# Patient Record
Sex: FEMALE | URBAN - METROPOLITAN AREA
[De-identification: names, ages, dates, MRNs, and addresses within clinical notes are randomized per-mention and may not be internally consistent; named-entity substitution may affect disease eponyms.]

---

## 2021-04-27 ENCOUNTER — OFFICE VISIT (OUTPATIENT)
Dept: URBAN - METROPOLITAN AREA CLINIC 52 | Facility: CLINIC | Age: 36
End: 2021-04-27

## 2021-05-26 ENCOUNTER — LAB OUTSIDE AN ENCOUNTER (OUTPATIENT)
Dept: URBAN - METROPOLITAN AREA CLINIC 98 | Facility: CLINIC | Age: 36
End: 2021-05-26

## 2021-05-26 ENCOUNTER — OFFICE VISIT (OUTPATIENT)
Dept: URBAN - METROPOLITAN AREA CLINIC 98 | Facility: CLINIC | Age: 36
End: 2021-05-26
Payer: COMMERCIAL

## 2021-05-26 VITALS
WEIGHT: 209.6 LBS | DIASTOLIC BLOOD PRESSURE: 81 MMHG | BODY MASS INDEX: 31.04 KG/M2 | HEIGHT: 69 IN | HEART RATE: 81 BPM | SYSTOLIC BLOOD PRESSURE: 123 MMHG | TEMPERATURE: 97.7 F

## 2021-05-26 DIAGNOSIS — K51.00 ULCERATIVE PANCOLITIS WITHOUT COMPLICATION: ICD-10-CM

## 2021-05-26 DIAGNOSIS — K92.1 HEMATOCHEZIA: ICD-10-CM

## 2021-05-26 DIAGNOSIS — R10.9 ABDOMINAL PAIN: ICD-10-CM

## 2021-05-26 DIAGNOSIS — Z91.89 COLON CANCER HIGH RISK: ICD-10-CM

## 2021-05-26 DIAGNOSIS — R19.7 DIARRHEA: ICD-10-CM

## 2021-05-26 PROCEDURE — 99205 OFFICE O/P NEW HI 60 MIN: CPT | Performed by: INTERNAL MEDICINE

## 2021-05-26 RX ORDER — PREDNISONE 10 MG/1
2 TABLETS TABLET ORAL ONCE A DAY
Qty: 60 | Refills: 0 | OUTPATIENT
Start: 2021-05-26 | End: 2021-06-25

## 2021-05-26 RX ORDER — POLYETHYLENE GLYCOL 3350, SODIUM SULFATE, SODIUM CHLORIDE, POTASSIUM CHLORIDE, ASCORBIC ACID, SODIUM ASCORBATE 140-9-5.2G
1 KIT KIT ORAL ONCE
Qty: 1 | Refills: 1 | OUTPATIENT
Start: 2021-05-26 | End: 2021-05-28

## 2021-05-26 RX ORDER — BALSALAZIDE DISODIUM 750 MG/1
6 TAB CAPSULE ORAL QD
Qty: 180 TABLET | Refills: 11 | OUTPATIENT
Start: 2021-05-26 | End: 2022-05-21

## 2021-05-26 NOTE — HPI-TODAY'S VISIT:
Pt Tom is a 36 y/o with dx of UC. . Pt was diagnosed with UC in  by Dr. Morocho.  She has severe anemia associated with her colitis requiring 3 blood transfusions. . Today on 21, pt reports that she has rectal bleeding, diarrhea, urgency sxs.  6-10 BM per day, severe abdominal pain, diffuse in nature, nothing makes it better or worse.  She was taking some iron piils.   . may 19 2021: labs: cr 0.7, wbc 4.5, hgb 7.7 ct scan: mild left colonic thickening,  PMH: h/o partial nephrectomy, lupus, UC, ,  Meds: iron pill SH: no smoke/etoh

## 2021-06-22 ENCOUNTER — TELEPHONE ENCOUNTER (OUTPATIENT)
Dept: URBAN - METROPOLITAN AREA CLINIC 98 | Facility: CLINIC | Age: 36
End: 2021-06-22

## 2021-06-29 ENCOUNTER — WEB ENCOUNTER (OUTPATIENT)
Dept: URBAN - METROPOLITAN AREA CLINIC 98 | Facility: CLINIC | Age: 36
End: 2021-06-29

## 2021-07-01 ENCOUNTER — OFFICE VISIT (OUTPATIENT)
Dept: URBAN - METROPOLITAN AREA SURGERY CENTER 18 | Facility: SURGERY CENTER | Age: 36
End: 2021-07-01

## 2021-07-01 ENCOUNTER — TELEPHONE ENCOUNTER (OUTPATIENT)
Dept: URBAN - METROPOLITAN AREA CLINIC 98 | Facility: CLINIC | Age: 36
End: 2021-07-01

## 2021-07-01 RX ORDER — POLYETHYLENE GLYCOL 3350, SODIUM SULFATE, SODIUM CHLORIDE, POTASSIUM CHLORIDE, ASCORBIC ACID, SODIUM ASCORBATE 140-9-5.2G
1 KIT KIT ORAL ONCE
Qty: 1 | Refills: 1 | OUTPATIENT
Start: 2021-07-02 | End: 2021-07-04

## 2021-07-01 RX ORDER — BALSALAZIDE DISODIUM 750 MG/1
6 TAB CAPSULE ORAL QD
Qty: 180 TABLET | Refills: 11 | Status: ACTIVE | COMMUNITY
Start: 2021-05-26 | End: 2022-05-21

## 2021-07-23 ENCOUNTER — TELEPHONE ENCOUNTER (OUTPATIENT)
Dept: URBAN - METROPOLITAN AREA CLINIC 92 | Facility: CLINIC | Age: 36
End: 2021-07-23

## 2021-07-23 ENCOUNTER — OFFICE VISIT (OUTPATIENT)
Dept: URBAN - METROPOLITAN AREA TELEHEALTH 2 | Facility: TELEHEALTH | Age: 36
End: 2021-07-23
Payer: COMMERCIAL

## 2021-07-23 DIAGNOSIS — D50.8 ACQUIRED IRON DEFICIENCY ANEMIA DUE TO DECREASED ABSORPTION: ICD-10-CM

## 2021-07-23 DIAGNOSIS — R19.7 DIARRHEA: ICD-10-CM

## 2021-07-23 DIAGNOSIS — R10.84 ABDOMINAL CRAMPING, GENERALIZED: ICD-10-CM

## 2021-07-23 DIAGNOSIS — K51.011 CHRONIC ULCERATIVE ENTEROCOLITIS WITH RECTAL BLEEDING: ICD-10-CM

## 2021-07-23 PROCEDURE — 99215 OFFICE O/P EST HI 40 MIN: CPT | Performed by: INTERNAL MEDICINE

## 2021-07-23 RX ORDER — ADALIMUMAB 80MG/0.8ML
80MG DAY 1, DAY 2, DAY 15 KIT SUBCUTANEOUS
Qty: 3 PEN SYRINGE DOSES | Refills: 0 | OUTPATIENT
Start: 2021-07-23 | End: 2021-08-07

## 2021-07-23 RX ORDER — PREDNISONE 10 MG/1
2 TABLETS TABLET ORAL ONCE A DAY
Qty: 60 | Refills: 0 | OUTPATIENT
Start: 2021-07-23 | End: 2021-08-22

## 2021-07-23 RX ORDER — ADALIMUMAB 40MG/0.4ML
AS DIRECTED KIT SUBCUTANEOUS
Qty: 1 | Refills: 11 | OUTPATIENT
Start: 2021-07-23 | End: 2021-08-04

## 2021-07-23 RX ORDER — SULFASALAZINE 500 MG/1
6 TABLET TABLET ORAL ONCE A DAY
Qty: 180 TABLET | Refills: 11 | OUTPATIENT
Start: 2021-07-23 | End: 2022-07-18

## 2021-07-23 RX ORDER — BALSALAZIDE DISODIUM 750 MG/1
6 TAB CAPSULE ORAL QD
Qty: 180 TABLET | Refills: 11 | Status: ACTIVE | COMMUNITY
Start: 2021-05-26 | End: 2022-05-21

## 2021-07-23 NOTE — PHYSICAL EXAM CHEST:
Last OV: 10/29/19  Next OV: X  Labs: BMP*10/25/19-   LIPID*06/27/18  EKG:10/29/19  PT NEED LAB WORK FOR MORE REFILLS no lesions,  no deformities,  no traumatic injuries,  no significant scars are present,  chest wall non-tender,  no masses present, breathing is unlabored without accessory muscle use,normal breath sounds

## 2021-07-23 NOTE — HPI-TODAY'S VISIT:
Pt Tom is a 36 y/o with dx of UC, currently on Colazol (9 tab) here for f/u. . Pt was diagnosed with UC in  by Dr. Morocho.  She has severe anemia associated with her colitis requiring 3 blood transfusions. . Previously on 21, pt reports that she has rectal bleeding, diarrhea, urgency sxs.  6-10 BM per day, severe abdominal pain, diffuse in nature, nothing makes it better or worse.  She was taking some iron piils.   . Today on 2021, pt reports that she had to go to hospital 2 weeks after seeing us, was told that Hgb was fine.  She then went to PCP 1 week ago, Hgb 7.2.  No improvement with the Colazol.  She took some prednisone. . May 19 2021: labs: cr 0.7, wbc 4.5, hgb 7.7 ct scan: mild left colonic thickening, . PMH: h/o partial nephrectomy, lupus, UC, ,  Meds: iron pill SH: no smoke/etoh FH: Cousin with UC

## 2021-07-26 ENCOUNTER — OFFICE VISIT (OUTPATIENT)
Dept: URBAN - METROPOLITAN AREA CLINIC 98 | Facility: CLINIC | Age: 36
End: 2021-07-26

## 2021-07-30 ENCOUNTER — LAB OUTSIDE AN ENCOUNTER (OUTPATIENT)
Dept: URBAN - METROPOLITAN AREA TELEHEALTH 2 | Facility: TELEHEALTH | Age: 36
End: 2021-07-30

## 2021-08-05 ENCOUNTER — OFFICE VISIT (OUTPATIENT)
Dept: URBAN - METROPOLITAN AREA SURGERY CENTER 18 | Facility: SURGERY CENTER | Age: 36
End: 2021-08-05

## 2021-08-05 RX ORDER — ADALIMUMAB 80MG/0.8ML
80MG DAY 1, DAY 2, DAY 15 KIT SUBCUTANEOUS
Qty: 3 PEN SYRINGE DOSES | Refills: 0 | Status: ACTIVE | COMMUNITY
Start: 2021-07-23 | End: 2021-08-07

## 2021-08-05 RX ORDER — PREDNISONE 10 MG/1
2 TABLETS TABLET ORAL ONCE A DAY
Qty: 60 | Refills: 0 | Status: ACTIVE | COMMUNITY
Start: 2021-07-23 | End: 2021-08-22

## 2021-08-05 RX ORDER — BALSALAZIDE DISODIUM 750 MG/1
6 TAB CAPSULE ORAL QD
Qty: 180 TABLET | Refills: 11 | Status: ACTIVE | COMMUNITY
Start: 2021-05-26 | End: 2022-05-21

## 2021-08-05 RX ORDER — SULFASALAZINE 500 MG/1
6 TABLET TABLET ORAL ONCE A DAY
Qty: 180 TABLET | Refills: 11 | Status: ACTIVE | COMMUNITY
Start: 2021-07-23 | End: 2022-07-18

## 2021-08-19 ENCOUNTER — OFFICE VISIT (OUTPATIENT)
Dept: URBAN - METROPOLITAN AREA CLINIC 97 | Facility: CLINIC | Age: 36
End: 2021-08-19
Payer: COMMERCIAL

## 2021-08-19 DIAGNOSIS — E61.1 HYPOFERREMIA: ICD-10-CM

## 2021-08-19 PROCEDURE — 96365 THER/PROPH/DIAG IV INF INIT: CPT | Performed by: INTERNAL MEDICINE

## 2021-08-19 RX ORDER — SULFASALAZINE 500 MG/1
6 TABLET TABLET ORAL ONCE A DAY
Qty: 180 TABLET | Refills: 11 | Status: ACTIVE | COMMUNITY
Start: 2021-07-23 | End: 2022-07-18

## 2021-08-19 RX ORDER — PREDNISONE 10 MG/1
2 TABLETS TABLET ORAL ONCE A DAY
Qty: 60 | Refills: 0 | Status: ACTIVE | COMMUNITY
Start: 2021-07-23 | End: 2021-08-22

## 2021-08-19 RX ORDER — BALSALAZIDE DISODIUM 750 MG/1
6 TAB CAPSULE ORAL QD
Qty: 180 TABLET | Refills: 11 | Status: ACTIVE | COMMUNITY
Start: 2021-05-26 | End: 2022-05-21

## 2021-08-26 ENCOUNTER — OFFICE VISIT (OUTPATIENT)
Dept: URBAN - METROPOLITAN AREA CLINIC 97 | Facility: CLINIC | Age: 36
End: 2021-08-26

## 2021-08-26 ENCOUNTER — TELEPHONE ENCOUNTER (OUTPATIENT)
Dept: URBAN - METROPOLITAN AREA CLINIC 18 | Facility: CLINIC | Age: 36
End: 2021-08-26

## 2021-09-02 ENCOUNTER — OFFICE VISIT (OUTPATIENT)
Dept: URBAN - METROPOLITAN AREA CLINIC 97 | Facility: CLINIC | Age: 36
End: 2021-09-02
Payer: COMMERCIAL

## 2021-09-02 DIAGNOSIS — E61.1 HYPOFERREMIA: ICD-10-CM

## 2021-09-02 PROCEDURE — 96365 THER/PROPH/DIAG IV INF INIT: CPT | Performed by: INTERNAL MEDICINE

## 2021-09-02 RX ORDER — BALSALAZIDE DISODIUM 750 MG/1
6 TAB CAPSULE ORAL QD
Qty: 180 TABLET | Refills: 11 | Status: ACTIVE | COMMUNITY
Start: 2021-05-26 | End: 2022-05-21

## 2021-09-02 RX ORDER — SULFASALAZINE 500 MG/1
6 TABLET TABLET ORAL ONCE A DAY
Qty: 180 TABLET | Refills: 11 | Status: ACTIVE | COMMUNITY
Start: 2021-07-23 | End: 2022-07-18

## 2022-08-18 ENCOUNTER — TELEPHONE ENCOUNTER (OUTPATIENT)
Dept: URBAN - METROPOLITAN AREA CLINIC 92 | Facility: CLINIC | Age: 37
End: 2022-08-18

## 2022-08-18 RX ORDER — ADALIMUMAB 40MG/0.4ML
1 PEN KIT SUBCUTANEOUS
Qty: 1 | Refills: 11 | OUTPATIENT
Start: 2022-08-18 | End: 2022-08-30

## 2022-08-25 ENCOUNTER — TELEPHONE ENCOUNTER (OUTPATIENT)
Dept: URBAN - METROPOLITAN AREA CLINIC 96 | Facility: CLINIC | Age: 37
End: 2022-08-25

## 2022-08-25 RX ORDER — ADALIMUMAB 40MG/0.4ML
1 PEN KIT SUBCUTANEOUS
Qty: 1 | Refills: 11
Start: 2022-08-18 | End: 2022-09-14

## 2022-09-13 ENCOUNTER — TELEPHONE ENCOUNTER (OUTPATIENT)
Dept: URBAN - METROPOLITAN AREA CLINIC 6 | Facility: CLINIC | Age: 37
End: 2022-09-13

## 2022-09-13 ENCOUNTER — LAB OUTSIDE AN ENCOUNTER (OUTPATIENT)
Dept: URBAN - METROPOLITAN AREA CLINIC 96 | Facility: CLINIC | Age: 37
End: 2022-09-13

## 2022-09-13 RX ORDER — ADALIMUMAB 40MG/0.4ML
1 PEN KIT SUBCUTANEOUS
Qty: 1 | Refills: 11
Start: 2022-08-18 | End: 2022-09-26

## 2022-09-27 ENCOUNTER — OFFICE VISIT (OUTPATIENT)
Dept: URBAN - METROPOLITAN AREA CLINIC 96 | Facility: CLINIC | Age: 37
End: 2022-09-27

## 2022-10-02 LAB
A/G RATIO: 1.1
ABSOLUTE BASOPHILS: 31
ABSOLUTE EOSINOPHILS: 0
ABSOLUTE LYMPHOCYTES: 1309
ABSOLUTE MONOCYTES: 878
ABSOLUTE NEUTROPHILS: (no result)
ADALIMUMAB AB, IBD: <10
ADALIMUMAB AB, IBD: <10
ADALIMUMAB LEVEL, IBD: 3
ALBUMIN: 3.7
ALKALINE PHOSPHATASE: 63
ALT (SGPT): 9
AST (SGOT): 10
BASOPHILS: 0.2
BILIRUBIN, TOTAL: 0.2
BUN/CREATININE RATIO: (no result)
BUN: 19
CALCIUM: 9.7
CARBON DIOXIDE, TOTAL: 24
CBC MORPHOLOGY: (no result)
CHLORIDE: 105
COMMENT: (no result)
CREATININE: 0.76
EGFR: 105
EOSINOPHILS: 0
GLOBULIN, TOTAL: 3.5
GLUCOSE: 97
HEMATOCRIT: 23.9
HEMOGLOBIN: 6.9
INTERPRETATION: (no result)
LYMPHOCYTES: 8.5
MCH: 19
MCHC: 28.9
MCV: 65.8
MITOGEN-NIL: 8.39
MONOCYTES: 5.7
MPV: 9.1
NEUTROPHILS: 85.6
NIL: 0.17
PLATELET COUNT: 626
POTASSIUM: 4.3
PROTEIN, TOTAL: 7.2
QUANTIFERON(R)-TB GOLD: NEGATIVE
RDW: 16.6
RED BLOOD CELL COUNT: 3.63
SODIUM: 139
TB1-NIL: <0
TB2-NIL: <0
WHITE BLOOD CELL COUNT: 15.4

## 2022-10-16 ENCOUNTER — TELEPHONE ENCOUNTER (OUTPATIENT)
Dept: URBAN - METROPOLITAN AREA CLINIC 92 | Facility: CLINIC | Age: 37
End: 2022-10-16

## 2022-11-01 ENCOUNTER — OFFICE VISIT (OUTPATIENT)
Dept: URBAN - METROPOLITAN AREA CLINIC 96 | Facility: CLINIC | Age: 37
End: 2022-11-01

## 2022-11-01 VITALS — HEIGHT: 69 IN

## 2022-11-28 ENCOUNTER — CLAIMS CREATED FROM THE CLAIM WINDOW (OUTPATIENT)
Dept: URBAN - METROPOLITAN AREA TELEHEALTH 2 | Facility: TELEHEALTH | Age: 37
End: 2022-11-28
Payer: COMMERCIAL

## 2022-11-28 ENCOUNTER — TELEPHONE ENCOUNTER (OUTPATIENT)
Dept: URBAN - METROPOLITAN AREA CLINIC 92 | Facility: CLINIC | Age: 37
End: 2022-11-28

## 2022-11-28 ENCOUNTER — LAB OUTSIDE AN ENCOUNTER (OUTPATIENT)
Dept: URBAN - METROPOLITAN AREA TELEHEALTH 2 | Facility: TELEHEALTH | Age: 37
End: 2022-11-28

## 2022-11-28 DIAGNOSIS — Z91.89 COLON CANCER HIGH RISK: ICD-10-CM

## 2022-11-28 DIAGNOSIS — K92.1 HEMATOCHEZIA: ICD-10-CM

## 2022-11-28 DIAGNOSIS — D50.9 IRON DEFICIENCY ANEMIA: ICD-10-CM

## 2022-11-28 DIAGNOSIS — R19.7 DIARRHEA: ICD-10-CM

## 2022-11-28 DIAGNOSIS — R10.9 ABDOMINAL PAIN: ICD-10-CM

## 2022-11-28 DIAGNOSIS — K51.00 ULCERATIVE PANCOLITIS WITHOUT COMPLICATION: ICD-10-CM

## 2022-11-28 PROCEDURE — 99215 OFFICE O/P EST HI 40 MIN: CPT | Performed by: INTERNAL MEDICINE

## 2022-11-28 RX ORDER — FERRIC CARBOXYMALTOSE INJECTION 50 MG/ML
AS DIRECTED INJECTION, SOLUTION INTRAVENOUS
Qty: 2 | Refills: 0 | OUTPATIENT
Start: 2022-11-28 | End: 2022-12-12

## 2022-11-28 RX ORDER — ADALIMUMAB 40MG/0.4ML
1 PEN KIT SUBCUTANEOUS
Qty: 1 | Refills: 11 | OUTPATIENT
Start: 2022-11-28 | End: 2022-12-10

## 2022-11-28 RX ORDER — SULFASALAZINE 500 MG/1
6 TABLET TABLET ORAL ONCE A DAY
Qty: 180 TABLET | Refills: 11 | OUTPATIENT
Start: 2022-11-28 | End: 2023-11-23

## 2022-11-28 RX ORDER — PANTOPRAZOLE SODIUM 40 MG/1
1 TABLET TABLET, DELAYED RELEASE ORAL EVERY 12 HOURS
Qty: 60 TABLET | Refills: 11 | OUTPATIENT
Start: 2022-11-28

## 2022-11-28 RX ORDER — PREDNISONE 10 MG/1
1 TABLET TABLET ORAL ONCE A DAY
Qty: 30 TABLET | Refills: 0 | OUTPATIENT
Start: 2022-11-28 | End: 2022-12-28

## 2022-11-28 NOTE — PHYSICAL EXAM CONSTITUTIONAL:
well developed, well nourished , in no acute distress , ambulating without difficulty , normal communication ability
Followed protocol

## 2022-11-28 NOTE — HPI-TODAY'S VISIT:
Pt Tom is a 35 y/o with dx of UC, currently on Humira (every 2 weeks, started ) and previously Colazol (9 tab) here for f/u. . Pt was diagnosed with UC in  by Dr. Morocho.  She has severe anemia associated with her colitis requiring 3 blood transfusions. . Previously on 21, pt reports that she has rectal bleeding, diarrhea, urgency sxs.  6-10 BM per day, severe abdominal pain, diffuse in nature, nothing makes it better or worse.  She was taking some iron piils.   . Previously on 2021, pt reports that she had to go to hospital 2 weeks after seeing us, was told that Hgb was fine.  She then went to PCP 1 week ago, Hgb 7.2.  No improvement with the Colazol.  She took some prednisone. . Today on 2022, pt reports that she is taking all of her meds, Humira is working well.  No need for any blood transfusions over the past year.  Having 3-4 loose BM, some blood in the stool.  Urgency is improved, but still present on some occasions. Occasional abd pain.  Having some fever blisters (when iron levels low). . May 19 2021: labs: cr 0.7, wbc 4.5, hgb 7.7 ct scan: mild left colonic thickening, . PMH: h/o partial nephrectomy, lupus, UC, ,  Meds: iron pill SH: no smoke/etoh FH: Cousin with UC

## 2022-12-01 ENCOUNTER — WEB ENCOUNTER (OUTPATIENT)
Dept: URBAN - METROPOLITAN AREA CLINIC 96 | Facility: CLINIC | Age: 37
End: 2022-12-01

## 2022-12-01 RX ORDER — SULFASALAZINE 500 MG/1
6 TABLET TABLET ORAL ONCE A DAY
Qty: 180 TABLET | Refills: 11
Start: 2022-11-28 | End: 2023-11-27

## 2022-12-06 ENCOUNTER — WEB ENCOUNTER (OUTPATIENT)
Dept: URBAN - METROPOLITAN AREA CLINIC 98 | Facility: CLINIC | Age: 37
End: 2022-12-06

## 2022-12-06 RX ORDER — PANTOPRAZOLE SODIUM 40 MG/1
1 TABLET TABLET, DELAYED RELEASE ORAL EVERY 12 HOURS
Qty: 60 TABLET | Refills: 11 | Status: ACTIVE | COMMUNITY
Start: 2022-11-28

## 2022-12-06 RX ORDER — SULFASALAZINE 500 MG/1
6 TABLET TABLET ORAL ONCE A DAY
Qty: 180 TABLET | Refills: 11 | Status: ACTIVE | COMMUNITY
Start: 2022-11-28 | End: 2023-11-27

## 2022-12-06 RX ORDER — BALSALAZIDE DISODIUM 750 MG/1
6 CAPSULES CAPSULE ORAL ONCE DAILY
Qty: 180 CAPSULE | Refills: 11 | OUTPATIENT
Start: 2022-12-07 | End: 2023-12-02

## 2022-12-06 RX ORDER — ADALIMUMAB 40MG/0.4ML
1 PEN KIT SUBCUTANEOUS
Qty: 1 | Refills: 11 | Status: ACTIVE | COMMUNITY
Start: 2022-11-28 | End: 2022-12-10

## 2022-12-06 RX ORDER — PREDNISONE 10 MG/1
1 TABLET TABLET ORAL ONCE A DAY
Qty: 30 TABLET | Refills: 0 | Status: ACTIVE | COMMUNITY
Start: 2022-11-28 | End: 2022-12-28

## 2022-12-06 RX ORDER — FERRIC CARBOXYMALTOSE INJECTION 50 MG/ML
AS DIRECTED INJECTION, SOLUTION INTRAVENOUS
Qty: 2 | Refills: 0 | Status: ACTIVE | COMMUNITY
Start: 2022-11-28 | End: 2022-12-12

## 2022-12-13 ENCOUNTER — OFFICE VISIT (OUTPATIENT)
Dept: URBAN - METROPOLITAN AREA CLINIC 97 | Facility: CLINIC | Age: 37
End: 2022-12-13
Payer: COMMERCIAL

## 2022-12-13 ENCOUNTER — WEB ENCOUNTER (OUTPATIENT)
Dept: URBAN - METROPOLITAN AREA CLINIC 98 | Facility: CLINIC | Age: 37
End: 2022-12-13

## 2022-12-13 VITALS
HEIGHT: 69 IN | BODY MASS INDEX: 27.4 KG/M2 | HEART RATE: 84 BPM | SYSTOLIC BLOOD PRESSURE: 138 MMHG | TEMPERATURE: 97 F | RESPIRATION RATE: 18 BRPM | WEIGHT: 185 LBS | DIASTOLIC BLOOD PRESSURE: 80 MMHG

## 2022-12-13 DIAGNOSIS — D50.9 IRON DEFICIENCY ANEMIA: ICD-10-CM

## 2022-12-13 PROCEDURE — 96365 THER/PROPH/DIAG IV INF INIT: CPT | Performed by: INTERNAL MEDICINE

## 2022-12-13 RX ORDER — BALSALAZIDE DISODIUM 750 MG/1
6 CAPSULES CAPSULE ORAL ONCE DAILY
Qty: 180 CAPSULE | Refills: 11 | Status: ACTIVE | COMMUNITY
Start: 2022-12-07 | End: 2023-12-02

## 2022-12-13 RX ORDER — PREDNISONE 10 MG/1
1 TABLET TABLET ORAL ONCE A DAY
Qty: 30 TABLET | Refills: 0 | Status: ACTIVE | COMMUNITY
Start: 2022-11-28 | End: 2022-12-28

## 2022-12-13 RX ORDER — SULFASALAZINE 500 MG/1
6 TABLET TABLET ORAL ONCE A DAY
Qty: 180 TABLET | Refills: 11 | Status: ACTIVE | COMMUNITY
Start: 2022-11-28 | End: 2023-11-27

## 2022-12-13 RX ORDER — PANTOPRAZOLE SODIUM 40 MG/1
1 TABLET TABLET, DELAYED RELEASE ORAL EVERY 12 HOURS
Qty: 60 TABLET | Refills: 11 | Status: ACTIVE | COMMUNITY
Start: 2022-11-28

## 2022-12-14 PROBLEM — 87522002 IRON DEFICIENCY ANEMIA: Status: ACTIVE | Noted: 2021-07-23

## 2022-12-20 ENCOUNTER — OFFICE VISIT (OUTPATIENT)
Dept: URBAN - METROPOLITAN AREA CLINIC 97 | Facility: CLINIC | Age: 37
End: 2022-12-20
Payer: COMMERCIAL

## 2022-12-20 VITALS
HEART RATE: 87 BPM | TEMPERATURE: 97 F | BODY MASS INDEX: 30.3 KG/M2 | RESPIRATION RATE: 17 BRPM | SYSTOLIC BLOOD PRESSURE: 127 MMHG | HEIGHT: 69 IN | DIASTOLIC BLOOD PRESSURE: 90 MMHG | WEIGHT: 204.6 LBS

## 2022-12-20 DIAGNOSIS — D50.8 OTHER IRON DEFICIENCY ANEMIA: ICD-10-CM

## 2022-12-20 PROCEDURE — 96365 THER/PROPH/DIAG IV INF INIT: CPT | Performed by: INTERNAL MEDICINE

## 2022-12-20 RX ORDER — BALSALAZIDE DISODIUM 750 MG/1
6 CAPSULES CAPSULE ORAL ONCE DAILY
Qty: 180 CAPSULE | Refills: 11 | Status: ACTIVE | COMMUNITY
Start: 2022-12-07 | End: 2023-12-02

## 2022-12-20 RX ORDER — SULFASALAZINE 500 MG/1
6 TABLET TABLET ORAL ONCE A DAY
Qty: 180 TABLET | Refills: 11 | Status: ACTIVE | COMMUNITY
Start: 2022-11-28 | End: 2023-11-27

## 2022-12-20 RX ORDER — PANTOPRAZOLE SODIUM 40 MG/1
1 TABLET TABLET, DELAYED RELEASE ORAL EVERY 12 HOURS
Qty: 60 TABLET | Refills: 11 | Status: ACTIVE | COMMUNITY
Start: 2022-11-28

## 2022-12-20 RX ORDER — PREDNISONE 10 MG/1
1 TABLET TABLET ORAL ONCE A DAY
Qty: 30 TABLET | Refills: 0 | Status: ACTIVE | COMMUNITY
Start: 2022-11-28 | End: 2022-12-28

## 2022-12-22 PROBLEM — 87522002 IRON DEFICIENCY ANEMIA: Status: ACTIVE | Noted: 2022-12-22

## 2022-12-28 ENCOUNTER — TELEPHONE ENCOUNTER (OUTPATIENT)
Dept: URBAN - METROPOLITAN AREA CLINIC 92 | Facility: CLINIC | Age: 37
End: 2022-12-28

## 2022-12-28 RX ORDER — ADALIMUMAB 40MG/0.4ML
1 PEN KIT SUBCUTANEOUS
Qty: 1 | Refills: 11
Start: 2022-11-28 | End: 2023-01-09

## 2023-01-17 ENCOUNTER — WEB ENCOUNTER (OUTPATIENT)
Dept: URBAN - METROPOLITAN AREA CLINIC 98 | Facility: CLINIC | Age: 38
End: 2023-01-17

## 2023-01-17 RX ORDER — SULFASALAZINE 500 MG/1
6 TABLET TABLET ORAL ONCE A DAY
Qty: 180 TABLET | Refills: 11 | Status: ACTIVE | COMMUNITY
Start: 2022-11-28 | End: 2023-11-27

## 2023-01-17 RX ORDER — PANTOPRAZOLE SODIUM 40 MG/1
1 TABLET TABLET, DELAYED RELEASE ORAL EVERY 12 HOURS
Qty: 60 TABLET | Refills: 11 | Status: ACTIVE | COMMUNITY
Start: 2022-11-28

## 2023-01-17 RX ORDER — POLYETHYLENE GLYCOL 3350, SODIUM SULFATE, SODIUM CHLORIDE, POTASSIUM CHLORIDE, ASCORBIC ACID, SODIUM ASCORBATE 140-9-5.2G
1 KIT KIT ORAL ONCE
Qty: 1 | Refills: 1 | OUTPATIENT
Start: 2023-01-18 | End: 2023-01-20

## 2023-01-17 RX ORDER — BALSALAZIDE DISODIUM 750 MG/1
6 CAPSULES CAPSULE ORAL ONCE DAILY
Qty: 180 CAPSULE | Refills: 11 | Status: ACTIVE | COMMUNITY
Start: 2022-12-07 | End: 2023-12-02

## 2023-01-17 RX ORDER — ONDANSETRON HYDROCHLORIDE 4 MG/1
1 TABLET TABLET, FILM COATED ORAL EVERY 4 HOURS PRN
Qty: 3 | Refills: 0 | OUTPATIENT
Start: 2023-01-18

## 2023-02-07 PROBLEM — 442159003 CHRONIC ULCERATIVE PANCOLITIS: Status: ACTIVE | Noted: 2021-05-26

## 2023-02-23 ENCOUNTER — WEB ENCOUNTER (OUTPATIENT)
Dept: URBAN - METROPOLITAN AREA CLINIC 96 | Facility: CLINIC | Age: 38
End: 2023-02-23

## 2023-02-24 ENCOUNTER — OFFICE VISIT (OUTPATIENT)
Dept: URBAN - METROPOLITAN AREA SURGERY CENTER 18 | Facility: SURGERY CENTER | Age: 38
End: 2023-02-24

## 2023-06-17 ENCOUNTER — WEB ENCOUNTER (OUTPATIENT)
Dept: URBAN - METROPOLITAN AREA CLINIC 96 | Facility: CLINIC | Age: 38
End: 2023-06-17

## 2023-07-27 ENCOUNTER — TELEPHONE ENCOUNTER (OUTPATIENT)
Dept: URBAN - METROPOLITAN AREA CLINIC 96 | Facility: CLINIC | Age: 38
End: 2023-07-27

## 2023-07-27 ENCOUNTER — OFFICE VISIT (OUTPATIENT)
Dept: URBAN - METROPOLITAN AREA SURGERY CENTER 18 | Facility: SURGERY CENTER | Age: 38
End: 2023-07-27
Payer: COMMERCIAL

## 2023-07-27 DIAGNOSIS — K51.00 ACUTE ULCERATIVE PANCOLITIS: ICD-10-CM

## 2023-07-27 PROCEDURE — G8907 PT DOC NO EVENTS ON DISCHARG: HCPCS | Performed by: INTERNAL MEDICINE

## 2023-07-27 PROCEDURE — 45380 COLONOSCOPY AND BIOPSY: CPT | Performed by: INTERNAL MEDICINE

## 2023-07-27 RX ORDER — BALSALAZIDE DISODIUM 750 MG/1
6 CAPSULES CAPSULE ORAL ONCE DAILY
Qty: 180 CAPSULE | Refills: 11 | Status: ACTIVE | COMMUNITY
Start: 2022-12-07 | End: 2023-12-02

## 2023-07-27 RX ORDER — PANTOPRAZOLE SODIUM 40 MG/1
1 TABLET TABLET, DELAYED RELEASE ORAL EVERY 12 HOURS
Qty: 60 TABLET | Refills: 11 | Status: ACTIVE | COMMUNITY
Start: 2022-11-28

## 2023-07-27 RX ORDER — ONDANSETRON HYDROCHLORIDE 4 MG/1
1 TABLET TABLET, FILM COATED ORAL EVERY 4 HOURS PRN
Qty: 3 | Refills: 0 | Status: ACTIVE | COMMUNITY
Start: 2023-01-18

## 2023-07-27 RX ORDER — SULFASALAZINE 500 MG/1
6 TABLET TABLET ORAL ONCE A DAY
Qty: 180 TABLET | Refills: 11 | Status: ACTIVE | COMMUNITY
Start: 2022-11-28 | End: 2023-11-27

## 2023-07-31 ENCOUNTER — WEB ENCOUNTER (OUTPATIENT)
Dept: URBAN - METROPOLITAN AREA CLINIC 96 | Facility: CLINIC | Age: 38
End: 2023-07-31

## 2023-07-31 RX ORDER — METRONIDAZOLE 500 MG/1
1 TABLET TABLET, FILM COATED ORAL
Qty: 28 TABLET | Refills: 0 | OUTPATIENT
Start: 2023-07-31 | End: 2023-08-14

## 2023-08-01 ENCOUNTER — WEB ENCOUNTER (OUTPATIENT)
Dept: URBAN - METROPOLITAN AREA CLINIC 96 | Facility: CLINIC | Age: 38
End: 2023-08-01

## 2023-09-20 ENCOUNTER — OFFICE VISIT (OUTPATIENT)
Dept: URBAN - METROPOLITAN AREA CLINIC 96 | Facility: CLINIC | Age: 38
End: 2023-09-20

## 2023-09-20 RX ORDER — ONDANSETRON HYDROCHLORIDE 4 MG/1
1 TABLET TABLET, FILM COATED ORAL EVERY 4 HOURS PRN
Qty: 3 | Refills: 0 | Status: ACTIVE | COMMUNITY
Start: 2023-01-18

## 2023-09-20 RX ORDER — SULFASALAZINE 500 MG/1
6 TABLET TABLET ORAL ONCE A DAY
Qty: 180 TABLET | Refills: 11 | Status: ACTIVE | COMMUNITY
Start: 2022-11-28 | End: 2023-11-27

## 2023-09-20 RX ORDER — PANTOPRAZOLE SODIUM 40 MG/1
1 TABLET TABLET, DELAYED RELEASE ORAL EVERY 12 HOURS
Qty: 60 TABLET | Refills: 11 | Status: ACTIVE | COMMUNITY
Start: 2022-11-28

## 2023-09-20 RX ORDER — BALSALAZIDE DISODIUM 750 MG/1
6 CAPSULES CAPSULE ORAL ONCE DAILY
Qty: 180 CAPSULE | Refills: 11 | Status: ACTIVE | COMMUNITY
Start: 2022-12-07 | End: 2023-12-02

## 2023-10-23 ENCOUNTER — P2P PATIENT RECORD (OUTPATIENT)
Age: 38
End: 2023-10-23

## 2023-11-17 ENCOUNTER — OFFICE VISIT (OUTPATIENT)
Dept: URBAN - METROPOLITAN AREA CLINIC 52 | Facility: CLINIC | Age: 38
End: 2023-11-17

## 2023-11-17 RX ORDER — PANTOPRAZOLE SODIUM 40 MG/1
1 TABLET TABLET, DELAYED RELEASE ORAL EVERY 12 HOURS
Qty: 60 TABLET | Refills: 11 | Status: ACTIVE | COMMUNITY
Start: 2022-11-28

## 2023-11-17 RX ORDER — SULFASALAZINE 500 MG/1
6 TABLET TABLET ORAL ONCE A DAY
Qty: 180 TABLET | Refills: 11 | Status: ACTIVE | COMMUNITY
Start: 2022-11-28 | End: 2023-11-27

## 2023-11-17 RX ORDER — ONDANSETRON HYDROCHLORIDE 4 MG/1
1 TABLET TABLET, FILM COATED ORAL EVERY 4 HOURS PRN
Qty: 3 | Refills: 0 | Status: ACTIVE | COMMUNITY
Start: 2023-01-18

## 2023-11-17 RX ORDER — BALSALAZIDE DISODIUM 750 MG/1
6 CAPSULES CAPSULE ORAL ONCE DAILY
Qty: 180 CAPSULE | Refills: 11 | Status: ACTIVE | COMMUNITY
Start: 2022-12-07 | End: 2023-12-02

## 2023-12-27 ENCOUNTER — CLAIMS CREATED FROM THE CLAIM WINDOW (OUTPATIENT)
Dept: URBAN - METROPOLITAN AREA CLINIC 52 | Facility: CLINIC | Age: 38
End: 2023-12-27
Payer: COMMERCIAL

## 2023-12-27 ENCOUNTER — LAB OUTSIDE AN ENCOUNTER (OUTPATIENT)
Dept: URBAN - METROPOLITAN AREA CLINIC 52 | Facility: CLINIC | Age: 38
End: 2023-12-27

## 2023-12-27 ENCOUNTER — TELEPHONE ENCOUNTER (OUTPATIENT)
Dept: URBAN - METROPOLITAN AREA CLINIC 94 | Facility: CLINIC | Age: 38
End: 2023-12-27

## 2023-12-27 VITALS
SYSTOLIC BLOOD PRESSURE: 124 MMHG | TEMPERATURE: 98.1 F | OXYGEN SATURATION: 98 % | HEIGHT: 69 IN | HEART RATE: 115 BPM | WEIGHT: 208 LBS | DIASTOLIC BLOOD PRESSURE: 81 MMHG | BODY MASS INDEX: 30.81 KG/M2

## 2023-12-27 DIAGNOSIS — K52.9 CHRONIC DIARRHEA: ICD-10-CM

## 2023-12-27 DIAGNOSIS — K51.818 OTHER ULCERATIVE COLITIS WITH OTHER COMPLICATION: ICD-10-CM

## 2023-12-27 DIAGNOSIS — K62.5 RECTAL BLEEDING: ICD-10-CM

## 2023-12-27 DIAGNOSIS — K51.919 ULCERATIVE COLITIS WITH COMPLICATION, UNSPECIFIED LOCATION: ICD-10-CM

## 2023-12-27 DIAGNOSIS — R19.7 CHRONIC DIARRHEA: ICD-10-CM

## 2023-12-27 DIAGNOSIS — D50.8 OTHER IRON DEFICIENCY ANEMIA: ICD-10-CM

## 2023-12-27 PROBLEM — 64766004: Status: ACTIVE | Noted: 2023-12-27

## 2023-12-27 PROCEDURE — 99214 OFFICE O/P EST MOD 30 MIN: CPT | Performed by: PHYSICIAN ASSISTANT

## 2023-12-27 PROCEDURE — 99214 OFFICE O/P EST MOD 30 MIN: CPT | Performed by: INTERNAL MEDICINE

## 2023-12-27 RX ORDER — PANTOPRAZOLE SODIUM 40 MG/1
1 TABLET TABLET, DELAYED RELEASE ORAL EVERY 12 HOURS
Qty: 60 TABLET | Refills: 11 | Status: ON HOLD | COMMUNITY
Start: 2022-11-28

## 2023-12-27 RX ORDER — ONDANSETRON HYDROCHLORIDE 4 MG/1
1 TABLET TABLET, FILM COATED ORAL EVERY 4 HOURS PRN
Qty: 3 | Refills: 0 | Status: ON HOLD | COMMUNITY
Start: 2023-01-18

## 2023-12-27 RX ORDER — BALSALAZIDE DISODIUM 750 MG/1
2 CAPSULES CAPSULE ORAL TWICE A DAY
Status: ACTIVE | COMMUNITY

## 2023-12-27 RX ORDER — DULOXETINE 30 MG/1
1 CAPSULE CAPSULE, DELAYED RELEASE PELLETS ORAL ONCE A DAY
Status: ACTIVE | COMMUNITY

## 2023-12-27 NOTE — HPI-TODAY'S VISIT:
38 y.o. female with ulcerative colitis (diagnosed 2020 by Dr. Morocho), severe GRETEL requiring transfusions and iron infusions, and Lupus presents to office for UC follow up. She was previously followed by Dr. Lino, but his office does not accept her insurance at this time. She has been on Balsalazide and Humira for 2-3 years, with no significant improvement in UC symptoms. Symptoms include bloody diarrhea (up to 5 watery stools a day), abdominal bloating and discomfort. Patient states that after her July colonoscopy, Dr. Lino discussed starting Stelara, but she was unable to follow-up due to insurance. She continues to take Balsalazide, but has been out of Humira for 2 months.   Colonoscopy 7/27/23 (Dr. Lino) with erythematous mucosa AC, and diffuse moderate inflammation in TC, DC, and rectum. Pathology consistent with moderate to severe active colitis with ulceration.

## 2024-01-02 LAB
A/G RATIO: 1.2
ALBUMIN: 4
ALKALINE PHOSPHATASE: 74
ALT (SGPT): 9
AST (SGOT): 14
BILIRUBIN, TOTAL: 0.2
BUN/CREATININE RATIO: (no result)
BUN: 7
C-REACTIVE PROTEIN, QUANT: 2.3
CALCIUM: 9.4
CARBON DIOXIDE, TOTAL: 23
CHLORIDE: 106
CREATININE: 0.63
EGFR: 117
FERRITIN, SERUM: <1
FOLATE (FOLIC ACID), SERUM: 17.4
GLOBULIN, TOTAL: 3.4
GLUCOSE: 110
HEMATOCRIT: 25.9
HEMOGLOBIN: 7.2
HEPATITIS B CORE AB TOTAL: (no result)
HEPATITIS B SURFACE ANTIGEN: (no result)
IRON BIND.CAP.(TIBC): 399
IRON SATURATION: 3
IRON: 13
MCH: 18.5
MCHC: 27.8
MCV: 66.6
MITOGEN-NIL: 6.31
MPV: 9.6
PLATELET COUNT: 618
POTASSIUM: 4.4
PROTEIN, TOTAL: 7.4
QUANTIFERON NIL VALUE: 0.11
QUANTIFERON TB1 AG VALUE: 0.03
QUANTIFERON TB2 AG VALUE: 0.09
QUANTIFERON-TB GOLD PLUS: NEGATIVE
RDW: 15.8
RED BLOOD CELL COUNT: 3.89
SODIUM: 139
VITAMIN B12: 339
WHITE BLOOD CELL COUNT: 5.7

## 2024-01-04 ENCOUNTER — TELEPHONE ENCOUNTER (OUTPATIENT)
Dept: URBAN - METROPOLITAN AREA CLINIC 94 | Facility: CLINIC | Age: 39
End: 2024-01-04

## 2024-01-04 PROBLEM — 442159003: Status: ACTIVE | Noted: 2024-01-04

## 2024-01-09 ENCOUNTER — TELEPHONE ENCOUNTER (OUTPATIENT)
Dept: URBAN - METROPOLITAN AREA CLINIC 52 | Facility: CLINIC | Age: 39
End: 2024-01-09

## 2024-01-17 ENCOUNTER — OFFICE VISIT (OUTPATIENT)
Dept: URBAN - METROPOLITAN AREA SURGERY CENTER 17 | Facility: SURGERY CENTER | Age: 39
End: 2024-01-17

## 2024-02-26 ENCOUNTER — LAB (OUTPATIENT)
Dept: URBAN - METROPOLITAN AREA CLINIC 52 | Facility: CLINIC | Age: 39
End: 2024-02-26

## 2024-02-26 ENCOUNTER — OV EP (OUTPATIENT)
Dept: URBAN - METROPOLITAN AREA CLINIC 52 | Facility: CLINIC | Age: 39
End: 2024-02-26
Payer: MEDICARE

## 2024-02-26 VITALS
BODY MASS INDEX: 30.66 KG/M2 | TEMPERATURE: 99 F | HEIGHT: 69 IN | SYSTOLIC BLOOD PRESSURE: 140 MMHG | DIASTOLIC BLOOD PRESSURE: 83 MMHG | HEART RATE: 102 BPM | WEIGHT: 207 LBS | OXYGEN SATURATION: 97 %

## 2024-02-26 DIAGNOSIS — D50.9 IRON DEFICIENCY ANEMIA, UNSPECIFIED IRON DEFICIENCY ANEMIA TYPE: ICD-10-CM

## 2024-02-26 DIAGNOSIS — K51.911 ULCERATIVE COLITIS WITH RECTAL BLEEDING, UNSPECIFIED LOCATION: ICD-10-CM

## 2024-02-26 PROBLEM — 64766004: Status: ACTIVE | Noted: 2024-02-26

## 2024-02-26 PROBLEM — 87522002: Status: ACTIVE | Noted: 2024-02-26

## 2024-02-26 PROCEDURE — 99214 OFFICE O/P EST MOD 30 MIN: CPT | Performed by: PHYSICIAN ASSISTANT

## 2024-02-26 RX ORDER — USTEKINUMAB 90 MG/ML
AS DIRECTED INJECTION, SOLUTION SUBCUTANEOUS
Qty: 1 | Refills: 2 | OUTPATIENT
Start: 2024-02-26 | End: 2024-08-12

## 2024-02-26 RX ORDER — BALSALAZIDE DISODIUM 750 MG/1
2 CAPSULES CAPSULE ORAL TWICE A DAY
Status: ACTIVE | COMMUNITY

## 2024-02-26 RX ORDER — PANTOPRAZOLE SODIUM 40 MG/1
1 TABLET TABLET, DELAYED RELEASE ORAL EVERY 12 HOURS
Qty: 60 TABLET | Refills: 11 | Status: ON HOLD | COMMUNITY
Start: 2022-11-28

## 2024-02-26 RX ORDER — DULOXETINE 30 MG/1
1 CAPSULE CAPSULE, DELAYED RELEASE PELLETS ORAL ONCE A DAY
Status: ACTIVE | COMMUNITY

## 2024-02-26 RX ORDER — ONDANSETRON HYDROCHLORIDE 4 MG/1
1 TABLET TABLET, FILM COATED ORAL EVERY 4 HOURS PRN
Qty: 3 | Refills: 0 | Status: ON HOLD | COMMUNITY
Start: 2023-01-18

## 2024-02-26 RX ORDER — USTEKINUMAB 130 MG/26ML
AS DIRECTED SOLUTION INTRAVENOUS ONCE
Qty: 1 | Refills: 0 | OUTPATIENT
Start: 2024-02-26 | End: 2024-02-27

## 2024-02-26 NOTE — PHYSICAL EXAM GASTROINTESTINAL
Abdomen , soft, mild diffuse tenderness on palpation, nondistended , no guarding or rigidity , no masses palpable , normal bowel sounds

## 2024-02-26 NOTE — HPI-TODAY'S VISIT:
38 y.o. female with ulcerative colitis (diagnosed 2020 by Dr. Morocho), severe GRETEL requiring transfusions and iron infusions, and Lupus presents to office for UC follow up. She was previously followed by Dr. Lino, but his office does not accept her insurance at this time. She has been on Balsalazide and Humira for 2-3 years, with no significant improvement in UC symptoms. Symptoms are getting worse, and she states she is having bloody, watery stools almost every hour during the day. Stools calm a little a night.  Also continues with abdominal bloating and discomfort. Patient states that after her July colonoscopy, Dr. Lino discussed starting Stelara, but she was unable to follow-up due to insurance. She continues to take Balsalazide, but has been out of Humira for several months.  Reports menses that last 13 days, with the first 6 days heavy flow. She was referred to GYN by her PCP, but has  not made appt at this time.   Labs 12/29/23: Hep B surface antigen and core antibody negative. CRP 2.3.  Quantiferon negative. CMP WNL.  Hgb 7.2.  Ferritin < 1.  Iron 13. Iron sat 3. Vitamin B12 and Folate normal. Stool studies were ordered (C. Diff,, GI PCR, and fecal calprotectin) but not done.   EGD was ordered, but patient canceled due to out of pocket expense. She states she should be able to schedule now. She has never had an EGD.   She was referred to hematologist in December for GRETEL work up and iron infusions, but states she never heard from hematologist to schedule.   Colonoscopy 7/27/23 (Dr. Lino) with erythematous mucosa AC, and diffuse moderate inflammation in TC, DC, and rectum. Pathology consistent with moderate to severe active colitis with ulceration.

## 2024-03-11 ENCOUNTER — COL/EGD (OUTPATIENT)
Dept: URBAN - METROPOLITAN AREA SURGERY CENTER 24 | Facility: SURGERY CENTER | Age: 39
End: 2024-03-11

## 2024-03-18 ENCOUNTER — STI (OUTPATIENT)
Dept: URBAN - METROPOLITAN AREA CLINIC 91 | Facility: CLINIC | Age: 39
End: 2024-03-18
Payer: MEDICARE

## 2024-03-18 VITALS
TEMPERATURE: 97.3 F | BODY MASS INDEX: 30.54 KG/M2 | WEIGHT: 206.2 LBS | SYSTOLIC BLOOD PRESSURE: 151 MMHG | DIASTOLIC BLOOD PRESSURE: 90 MMHG | RESPIRATION RATE: 18 BRPM | HEART RATE: 114 BPM | HEIGHT: 69 IN

## 2024-03-18 DIAGNOSIS — K51.80 CHRONIC PANCOLONIC ULCERATIVE COLITIS: ICD-10-CM

## 2024-03-18 PROCEDURE — 96365 THER/PROPH/DIAG IV INF INIT: CPT | Performed by: INTERNAL MEDICINE

## 2024-03-18 PROCEDURE — 96413 CHEMO IV INFUSION 1 HR: CPT | Performed by: INTERNAL MEDICINE

## 2024-03-18 RX ORDER — ONDANSETRON HYDROCHLORIDE 4 MG/1
1 TABLET TABLET, FILM COATED ORAL EVERY 4 HOURS PRN
Qty: 3 | Refills: 0 | Status: ON HOLD | COMMUNITY
Start: 2023-01-18

## 2024-03-18 RX ORDER — USTEKINUMAB 90 MG/ML
AS DIRECTED INJECTION, SOLUTION SUBCUTANEOUS
Qty: 1 | Refills: 2 | Status: ACTIVE | COMMUNITY
Start: 2024-02-26 | End: 2024-08-12

## 2024-03-18 RX ORDER — PANTOPRAZOLE SODIUM 40 MG/1
1 TABLET TABLET, DELAYED RELEASE ORAL EVERY 12 HOURS
Qty: 60 TABLET | Refills: 11 | Status: ON HOLD | COMMUNITY
Start: 2022-11-28

## 2024-03-18 RX ORDER — DULOXETINE 30 MG/1
1 CAPSULE CAPSULE, DELAYED RELEASE PELLETS ORAL ONCE A DAY
Status: ACTIVE | COMMUNITY

## 2024-03-18 RX ORDER — BALSALAZIDE DISODIUM 750 MG/1
2 CAPSULES CAPSULE ORAL TWICE A DAY
Status: ACTIVE | COMMUNITY

## 2024-03-25 ENCOUNTER — APPOINTMENT (RX ONLY)
Dept: URBAN - METROPOLITAN AREA CLINIC 162 | Facility: CLINIC | Age: 39
Setting detail: DERMATOLOGY
End: 2024-03-25

## 2024-03-25 DIAGNOSIS — L20.89 OTHER ATOPIC DERMATITIS: ICD-10-CM | Status: WORSENING

## 2024-03-25 PROCEDURE — ? PRESCRIPTION

## 2024-03-25 PROCEDURE — ? COUNSELING

## 2024-03-25 PROCEDURE — 99214 OFFICE O/P EST MOD 30 MIN: CPT

## 2024-03-25 RX ORDER — BETAMETHASONE DIPROPIONATE 0.5 MG/G
CREAM TOPICAL
Qty: 45 | Refills: 0 | Status: ERX | COMMUNITY
Start: 2024-03-25

## 2024-03-25 RX ORDER — RUXOLITINIB 15 MG/G
CREAM TOPICAL
Qty: 60 | Refills: 2 | Status: ERX | COMMUNITY
Start: 2024-03-25

## 2024-03-25 RX ORDER — TRIAMCINOLONE ACETONIDE 0.25 MG/G
CREAM TOPICAL
Qty: 80 | Refills: 0 | Status: ERX | COMMUNITY
Start: 2024-03-25

## 2024-03-25 RX ADMIN — BETAMETHASONE DIPROPIONATE: 0.5 CREAM TOPICAL at 00:00

## 2024-03-25 RX ADMIN — TRIAMCINOLONE ACETONIDE: 0.25 CREAM TOPICAL at 00:00

## 2024-03-25 RX ADMIN — RUXOLITINIB: 15 CREAM TOPICAL at 00:00

## 2024-03-25 ASSESSMENT — LOCATION SIMPLE DESCRIPTION DERM
LOCATION SIMPLE: RIGHT FOOT
LOCATION SIMPLE: LEFT HAND
LOCATION SIMPLE: RIGHT HAND
LOCATION SIMPLE: LEFT FOOT

## 2024-03-25 ASSESSMENT — LOCATION ZONE DERM
LOCATION ZONE: FEET
LOCATION ZONE: HAND

## 2024-03-25 ASSESSMENT — LOCATION DETAILED DESCRIPTION DERM
LOCATION DETAILED: RIGHT RADIAL DORSAL HAND
LOCATION DETAILED: LEFT DORSAL FOOT
LOCATION DETAILED: LEFT RADIAL DORSAL HAND
LOCATION DETAILED: RIGHT DORSAL FOOT

## 2024-03-25 NOTE — PROCEDURE: COUNSELING
Patient Specific Counseling (Will Not Stick From Patient To Patient): **dupixent paperwork filled today
Detail Level: Zone
Cleanser Recommendations: Dove Sensitive
Moisturizer Recommendations: CeraVe Cream

## 2024-03-25 NOTE — HPI: FOLLOW UP OTHER
What Is Your Reason For Requesting A Follow-Up Appointment?: Eczema, pt would like RF of opzelura and betamethasone

## 2024-03-25 NOTE — HPI: ITCHING
Additional History: would like to discuss allergy shots, pt taking Zyrtec qd and benadryll qhs, pt says TAC cream barely helps with itch, pt said she has itched like this before in teens for 7 years, pt recently noticed it started back 2 months ago

## 2024-03-27 ENCOUNTER — COL/EGD (OUTPATIENT)
Dept: URBAN - METROPOLITAN AREA SURGERY CENTER 17 | Facility: SURGERY CENTER | Age: 39
End: 2024-03-27

## 2024-04-08 ENCOUNTER — OV EP (OUTPATIENT)
Dept: URBAN - METROPOLITAN AREA CLINIC 52 | Facility: CLINIC | Age: 39
End: 2024-04-08

## 2024-04-15 PROBLEM — 444548001: Status: ACTIVE | Noted: 2024-04-15

## 2024-05-08 ENCOUNTER — OFFICE VISIT (OUTPATIENT)
Dept: URBAN - METROPOLITAN AREA SURGERY CENTER 17 | Facility: SURGERY CENTER | Age: 39
End: 2024-05-08

## 2024-05-22 ENCOUNTER — DASHBOARD ENCOUNTERS (OUTPATIENT)
Age: 39
End: 2024-05-22

## 2024-05-24 ENCOUNTER — OFFICE VISIT (OUTPATIENT)
Dept: URBAN - METROPOLITAN AREA CLINIC 52 | Facility: CLINIC | Age: 39
End: 2024-05-24

## 2024-06-06 ENCOUNTER — OFFICE VISIT (OUTPATIENT)
Dept: URBAN - METROPOLITAN AREA CLINIC 52 | Facility: CLINIC | Age: 39
End: 2024-06-06

## 2024-07-01 ENCOUNTER — OFFICE VISIT (OUTPATIENT)
Dept: URBAN - METROPOLITAN AREA CLINIC 52 | Facility: CLINIC | Age: 39
End: 2024-07-01
Payer: COMMERCIAL

## 2024-07-01 ENCOUNTER — LAB OUTSIDE AN ENCOUNTER (OUTPATIENT)
Dept: URBAN - METROPOLITAN AREA CLINIC 52 | Facility: CLINIC | Age: 39
End: 2024-07-01

## 2024-07-01 VITALS
WEIGHT: 215.4 LBS | TEMPERATURE: 98.1 F | BODY MASS INDEX: 31.9 KG/M2 | SYSTOLIC BLOOD PRESSURE: 135 MMHG | HEART RATE: 77 BPM | OXYGEN SATURATION: 99 % | HEIGHT: 69 IN | DIASTOLIC BLOOD PRESSURE: 83 MMHG

## 2024-07-01 DIAGNOSIS — K51.011 ULCERATIVE PANCOLITIS WITH RECTAL BLEEDING: ICD-10-CM

## 2024-07-01 DIAGNOSIS — D50.8 OTHER IRON DEFICIENCY ANEMIA: ICD-10-CM

## 2024-07-01 PROCEDURE — 99214 OFFICE O/P EST MOD 30 MIN: CPT | Performed by: INTERNAL MEDICINE

## 2024-07-01 RX ORDER — USTEKINUMAB 90 MG/ML
AS DIRECTED INJECTION, SOLUTION SUBCUTANEOUS
Qty: 1 | Refills: 2 | Status: ACTIVE | COMMUNITY
Start: 2024-02-26 | End: 2024-08-12

## 2024-07-01 RX ORDER — PANTOPRAZOLE SODIUM 40 MG/1
1 TABLET TABLET, DELAYED RELEASE ORAL EVERY 12 HOURS
Qty: 60 TABLET | Refills: 11 | Status: ON HOLD | COMMUNITY
Start: 2022-11-28

## 2024-07-01 RX ORDER — ONDANSETRON HYDROCHLORIDE 4 MG/1
1 TABLET TABLET, FILM COATED ORAL EVERY 4 HOURS PRN
Qty: 3 | Refills: 0 | Status: ON HOLD | COMMUNITY
Start: 2023-01-18

## 2024-07-01 RX ORDER — POLYETHYLENE GLYCOL 3350, SODIUM SULFATE ANHYDROUS, SODIUM BICARBONATE, SODIUM CHLORIDE, POTASSIUM CHLORIDE 236; 22.74; 6.74; 5.86; 2.97 G/4L; G/4L; G/4L; G/4L; G/4L
DRINK 4000ML POWDER, FOR SOLUTION ORAL AS DIRECTED
Qty: 4000 MILLILITER | Refills: 0 | OUTPATIENT
Start: 2024-07-01 | End: 2024-07-02

## 2024-07-01 RX ORDER — BALSALAZIDE DISODIUM 750 MG/1
2 CAPSULES CAPSULE ORAL TWICE A DAY
Status: ACTIVE | COMMUNITY

## 2024-07-01 RX ORDER — DULOXETINE 30 MG/1
1 CAPSULE CAPSULE, DELAYED RELEASE PELLETS ORAL ONCE A DAY
Status: ACTIVE | COMMUNITY

## 2024-07-01 NOTE — HPI-TODAY'S VISIT:
38 y.o. female with ulcerative colitis (diagnosed 2020 by Dr. Morocho), severe GRETEL requiring transfusions and iron infusions, and Lupus presents to office for UC follow up. She was previously followed by Dr. Lino, but his office does not accept her insurance at this time. Previously on Balsalazide and Humira for 2-3 years, with no significant improvement in UC symptoms. Started on Stelara 3/2024 and states she has seen improvement in fecal urgency (going from 9-10 watery stools a day, to now 3-4 loose stools a day). She continues to have rectal bleeding, however. She states she has seen significant improvement in abdominal pain, since being on Stelara, but still has severe bloating.   EGD/colonoscopy was ordered in February and has been rescheduled by patient several times. She has never had an EGD.   Reports menses that last 13 days, with the first 6 days heavy flow.  Follows hematologist and has regular iron infusions. Most recent Hgb 6/18/24 was 9.5. CMP normal.   Previous Workup: Labs 12/29/23: Hep B surface antigen and core antibody negative. CRP 2.3.  Quantiferon negative. CMP WNL.  Hgb 7.2.  Ferritin < 1.  Iron 13. Iron sat 3. Vitamin B12 and Folate normal. Stool studies were ordered (C. Diff,, GI PCR, and fecal calprotectin) but not done.   Colonoscopy 7/27/23 (Dr. Lino) with erythematous mucosa AC, and diffuse moderate inflammation in TC, DC, and rectum. Pathology consistent with moderate to severe active colitis with ulceration.

## 2024-07-26 RX ORDER — BETAMETHASONE DIPROPIONATE 0.5 MG/G
CREAM TOPICAL
Qty: 45 | Refills: 0 | Status: ERX

## 2024-08-05 ENCOUNTER — APPOINTMENT (RX ONLY)
Dept: URBAN - METROPOLITAN AREA CLINIC 159 | Facility: CLINIC | Age: 39
Setting detail: DERMATOLOGY
End: 2024-08-05

## 2024-08-05 DIAGNOSIS — B35.3 TINEA PEDIS: ICD-10-CM | Status: INADEQUATELY CONTROLLED

## 2024-08-05 DIAGNOSIS — B35.2 TINEA MANUUM: ICD-10-CM

## 2024-08-05 DIAGNOSIS — L20.89 OTHER ATOPIC DERMATITIS: ICD-10-CM | Status: WELL CONTROLLED

## 2024-08-05 PROCEDURE — 99214 OFFICE O/P EST MOD 30 MIN: CPT

## 2024-08-05 PROCEDURE — ? COUNSELING

## 2024-08-05 PROCEDURE — ? DUPIXENT MONITORING

## 2024-08-05 PROCEDURE — ? PRESCRIPTION MEDICATION MANAGEMENT

## 2024-08-05 PROCEDURE — ? PRESCRIPTION

## 2024-08-05 RX ORDER — KETOCONAZOLE 20 MG/G
CREAM TOPICAL
Qty: 60 | Refills: 3 | Status: ERX | COMMUNITY
Start: 2024-08-05

## 2024-08-05 RX ADMIN — KETOCONAZOLE: 20 CREAM TOPICAL at 00:00

## 2024-08-05 ASSESSMENT — LOCATION DETAILED DESCRIPTION DERM
LOCATION DETAILED: RIGHT MEDIAL PLANTAR MIDFOOT
LOCATION DETAILED: LEFT ULNAR PALM
LOCATION DETAILED: LEFT MEDIAL PLANTAR MIDFOOT
LOCATION DETAILED: LEFT RADIAL PALM
LOCATION DETAILED: RIGHT THENAR EMINENCE
LOCATION DETAILED: RIGHT PLANTAR FOREFOOT OVERLYING 4TH METATARSAL

## 2024-08-05 ASSESSMENT — LOCATION ZONE DERM
LOCATION ZONE: FEET
LOCATION ZONE: FEET
LOCATION ZONE: HAND

## 2024-08-05 ASSESSMENT — LOCATION SIMPLE DESCRIPTION DERM
LOCATION SIMPLE: RIGHT PLANTAR SURFACE
LOCATION SIMPLE: LEFT PLANTAR SURFACE
LOCATION SIMPLE: RIGHT PLANTAR SURFACE
LOCATION SIMPLE: RIGHT HAND
LOCATION SIMPLE: LEFT HAND

## 2024-08-05 ASSESSMENT — ITCH NUMERIC RATING SCALE: HOW SEVERE IS YOUR ITCHING?: 0

## 2024-08-05 ASSESSMENT — BSA ECZEMA: % BODY COVERED IN ECZEMA: 4

## 2024-08-05 NOTE — PROCEDURE: DUPIXENT MONITORING
Comments: Pt is well controlled on dupixent
Add High Risk Medication Management Associated Diagnosis?: Yes
Detail Level: Zone

## 2024-08-05 NOTE — PROCEDURE: PRESCRIPTION MEDICATION MANAGEMENT
Continue Regimen: Dupixent
Detail Level: Zone
Plan: RTC in 4 weeks
Discontinue Regimen: Betamethasone while using ketoconazole for hands and feet.  Can restart in 2 weeks prn
Render In Strict Bullet Format?: No

## 2024-08-22 ENCOUNTER — OFFICE VISIT (OUTPATIENT)
Dept: URBAN - METROPOLITAN AREA MEDICAL CENTER 34 | Facility: MEDICAL CENTER | Age: 39
End: 2024-08-22

## 2024-09-16 ENCOUNTER — TELEPHONE ENCOUNTER (OUTPATIENT)
Dept: URBAN - METROPOLITAN AREA CLINIC 94 | Facility: CLINIC | Age: 39
End: 2024-09-16

## 2024-09-16 RX ORDER — USTEKINUMAB 90 MG/ML
AS DIRECTED INJECTION, SOLUTION SUBCUTANEOUS
Qty: 1 | Refills: 2
Start: 2024-02-26 | End: 2025-03-03

## 2024-10-14 ENCOUNTER — OFFICE VISIT (OUTPATIENT)
Dept: URBAN - METROPOLITAN AREA MEDICAL CENTER 34 | Facility: MEDICAL CENTER | Age: 39
End: 2024-10-14

## 2024-11-06 ENCOUNTER — OFFICE VISIT (OUTPATIENT)
Dept: URBAN - METROPOLITAN AREA CLINIC 52 | Facility: CLINIC | Age: 39
End: 2024-11-06

## 2024-11-06 RX ORDER — DULOXETINE 30 MG/1
1 CAPSULE CAPSULE, DELAYED RELEASE PELLETS ORAL ONCE A DAY
COMMUNITY

## 2024-11-06 RX ORDER — UBROGEPANT 100 MG/1
TABLET ORAL
Qty: 16 TABLET | Status: ACTIVE | COMMUNITY

## 2024-11-06 RX ORDER — TOPIRAMATE 100 MG/1
TAKE 1.5 TABLETS (150MG TOTAL) BY MOUTH DAILY TABLET ORAL
Qty: 45 EACH | Refills: 1 | Status: ACTIVE | COMMUNITY

## 2024-11-06 RX ORDER — USTEKINUMAB 90 MG/ML
AS DIRECTED INJECTION, SOLUTION SUBCUTANEOUS
Qty: 1 | Refills: 2 | Status: ACTIVE | COMMUNITY
Start: 2024-02-26 | End: 2025-03-03

## 2024-11-06 RX ORDER — ALBUTEROL SULFATE 90 UG/1
1 PUFF AS NEEDED AEROSOL, METERED RESPIRATORY (INHALATION)
Status: ACTIVE | COMMUNITY

## 2024-11-06 RX ORDER — ONDANSETRON HYDROCHLORIDE 4 MG/1
1 TABLET TABLET, FILM COATED ORAL EVERY 4 HOURS PRN
Qty: 3 | Refills: 0 | COMMUNITY
Start: 2023-01-18

## 2024-11-06 RX ORDER — ALBUTEROL SULFATE 0.83 MG/ML
INHALE 3 ML BY NEBULIZATION EVERY 4 HOURS AS NEEDED FOR WHEEZING SOLUTION RESPIRATORY (INHALATION)
Qty: 75 MILLILITER | Refills: 0 | Status: ACTIVE | COMMUNITY

## 2024-11-06 RX ORDER — BALSALAZIDE DISODIUM 750 MG/1
2 CAPSULES CAPSULE ORAL TWICE A DAY
COMMUNITY

## 2024-11-06 RX ORDER — PANTOPRAZOLE SODIUM 40 MG/1
1 TABLET TABLET, DELAYED RELEASE ORAL EVERY 12 HOURS
Qty: 60 TABLET | Refills: 11 | Status: ACTIVE | COMMUNITY
Start: 2022-11-28

## 2024-11-06 RX ORDER — DUPILUMAB 300 MG/2ML
INJECTION, SOLUTION SUBCUTANEOUS
Qty: 4 MILLILITER | Status: ACTIVE | COMMUNITY

## 2024-11-06 RX ORDER — LORATADINE 10 MG
1 TABLET TABLET ORAL ONCE A DAY
Status: ACTIVE | COMMUNITY